# Patient Record
Sex: MALE | Race: WHITE | NOT HISPANIC OR LATINO | URBAN - METROPOLITAN AREA
[De-identification: names, ages, dates, MRNs, and addresses within clinical notes are randomized per-mention and may not be internally consistent; named-entity substitution may affect disease eponyms.]

---

## 2018-04-07 ENCOUNTER — EMERGENCY (EMERGENCY)
Facility: HOSPITAL | Age: 53
LOS: 1 days | Discharge: HOME | End: 2018-04-07
Admitting: EMERGENCY MEDICINE
Payer: OTHER MISCELLANEOUS

## 2018-04-07 VITALS
RESPIRATION RATE: 16 BRPM | HEART RATE: 86 BPM | TEMPERATURE: 99 F | OXYGEN SATURATION: 100 % | SYSTOLIC BLOOD PRESSURE: 135 MMHG | DIASTOLIC BLOOD PRESSURE: 80 MMHG

## 2018-04-07 DIAGNOSIS — S76.011A STRAIN OF MUSCLE, FASCIA AND TENDON OF RIGHT HIP, INITIAL ENCOUNTER: ICD-10-CM
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
## 2018-04-07 DIAGNOSIS — M25.551 PAIN IN RIGHT HIP: ICD-10-CM
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
## 2018-04-07 PROCEDURE — 99283 EMERGENCY DEPT VISIT LOW MDM: CPT

## 2018-04-07 RX ORDER — IBUPROFEN 200 MG
600 TABLET ORAL ONCE
Qty: 0 | Refills: 0 | Status: COMPLETED | OUTPATIENT
Start: 2018-04-07 | End: 2018-04-07

## 2018-04-07 RX ORDER — IBUPROFEN 200 MG
1 TABLET ORAL
Qty: 28 | Refills: 0 | OUTPATIENT
Start: 2018-04-07 | End: 2018-04-13

## 2018-04-07 RX ADMIN — Medication 600 MILLIGRAM(S): at 09:57

## 2018-04-07 NOTE — ED ADULT TRIAGE NOTE - CHIEF COMPLAINT QUOTE
Pt slipped at work, legs went in the opposite direction, causing the pt to fall. has bilateral hip pain.

## 2018-04-07 NOTE — ED ADULT NURSE NOTE - OBJECTIVE STATEMENT
Patient presents to ED with hip s/p mechanical slip and fall earlier today. Patient able to ambulate with steady gait. Denies numbness/tingling, weakness, LOC, head injury. Patient in NAD, resting comfortably, and will continue to monitor.

## 2018-04-07 NOTE — ED PROVIDER NOTE - MEDICAL DECISION MAKING DETAILS
hip strain with reproducible pain on flexion of hip however passive ROM intact and pt bearing weight and NVI.  no direct trauma.  strain, ortho f/u, ice and motrin

## 2018-04-07 NOTE — ED ADULT NURSE NOTE - CHPI ED SYMPTOMS NEG
no deformity/no fever/no tingling/no weakness/no abrasion/no back pain/no difficulty bearing weight/no numbness/no stiffness/no bruising

## 2018-04-07 NOTE — ED PROVIDER NOTE - OBJECTIVE STATEMENT
Pt is 53 yo male with hx of hip strain in the past presents with slip where he reports a split of his legs but with no direct contact to his hips or pelvis.  Pt denies any numbness, weakness, saddle anesthesia, back pain, urinary incontinence.  Pt ambulatory in ED and in NAD.  Reports pain worse with lifting his right leg.

## 2024-06-01 NOTE — ED ADULT NURSE NOTE - MUSCULOSKELETAL WDL
[facility] EMERGENCY DEP  EMERGENCY DEPARTMENT ENCOUNTER      Pt Name: Ramy Chin  MRN: [id number]  Birthdate 1981  Date of evaluation: 5/27/2024  Provider: Kaveh Lin MD    CHIEF COMPLAINT       Chief Complaint   Patient presents with    Skin Problem         HISTORY OF PRESENT ILLNESS   (Location/Symptom, Timing/Onset, Context/Setting, Quality, Duration, Modifying Factors, Severity)  Note limiting factors.   Patient is a 43-year-old male present emergency department complaining of \"bumps\" on his skin he says that these bumps are on his neck and face they have been there for the last several weeks patient had recently been to [facility] and was given reassurance that there was no dermatologic emergency patient feels that he is having worms crawling out of his skin during interview patient picking at his skin showing author pieces of skin stating \"see these are worms\".            Review of External Medical Records:     Nursing Notes were reviewed.    REVIEW OF SYSTEMS    (2-9 systems for level 4, 10 or more for level 5)     Review of Systems    Except as noted above the remainder of the review of systems was reviewed and negative.       PAST MEDICAL HISTORY     Past Medical History:   Diagnosis Date    Asthma          SURGICAL HISTORY     No past surgical history on file.      CURRENT MEDICATIONS       Discharge Medication List as of 5/27/2024  2:00 AM        CONTINUE these medications which have NOT CHANGED    Details   albuterol sulfate HFA (PROVENTIL;VENTOLIN;PROAIR) 108 (90 Base) MCG/ACT inhaler Inhale 2 puffs into the lungsHistorical Med      albuterol (PROVENTIL) (2.5 MG/3ML) 0.083% nebulizer solution Inhale into the lungs onceHistorical Med      permethrin (ELIMITE) 5 % cream Apply to your body, head to toe. Leave on for 8 hours, then rinse. You can repeat 1 week in 1 week if still having symptoms., Historical Med             ALLERGIES     Patient has no known allergies.    FAMILY HISTORY     No  physician with the below findings:        Interpretation per the Radiologist below, if available at the time of this note:    No orders to display        LABS:  Labs Reviewed - No data to display    All other labs were within normal range or not returned as of this dictation.    EMERGENCY DEPARTMENT COURSE and DIFFERENTIAL DIAGNOSIS/MDM:   Vitals:    Vitals:    05/27/24 0130   BP: (!) 153/93   Pulse: 99   Resp: 16   Temp: 98 °F (36.7 °C)   TempSrc: Oral   SpO2: 98%           Medical Decision Making  Psychogenic formication.  43-year-old male present emergency department with concerns of worms crawling out of his skin patient's physical exam reassuring that there are no dermatologic concerns no skin breakdown explained to patient that everything was normal will prescribe Atarax for symptoms patient will be discharged home.    Risk  Prescription drug management.            REASSESSMENT            CONSULTS:  None    PROCEDURES:  Unless otherwise noted below, none     Procedures      FINAL IMPRESSION      1. Psychogenic formication          DISPOSITION/PLAN   DISPOSITION Decision To Discharge 05/27/2024 01:58:18 AM      PATIENT REFERRED TO:  [facility] EMERGENCY DEP  [address] [place] [address]  [phone]    If symptoms worsen      DISCHARGE MEDICATIONS:  Discharge Medication List as of 5/27/2024  2:00 AM        START taking these medications    Details   hydrOXYzine HCl (ATARAX) 25 MG tablet Take 1 tablet by mouth every 8 hours as needed for Itching, Disp-30 tablet, R-0Normal               (Please note that portions of this note were completed with a voice recognition program.  Efforts were made to edit the dictations but occasionally words are mis-transcribed.)    Kaveh Lin MD (electronically signed)  Emergency Attending Physician / Physician Assistant / Nurse Practitioner             Kaveh Lin MD  06/01/24 8003     Full range of motion of upper and lower extremities, no joint tenderness/swelling.
